# Patient Record
Sex: FEMALE | Race: WHITE | NOT HISPANIC OR LATINO
[De-identification: names, ages, dates, MRNs, and addresses within clinical notes are randomized per-mention and may not be internally consistent; named-entity substitution may affect disease eponyms.]

---

## 2019-10-11 ENCOUNTER — APPOINTMENT (OUTPATIENT)
Dept: OTOLARYNGOLOGY | Facility: CLINIC | Age: 64
End: 2019-10-11
Payer: COMMERCIAL

## 2019-10-11 VITALS
WEIGHT: 120 LBS | DIASTOLIC BLOOD PRESSURE: 90 MMHG | SYSTOLIC BLOOD PRESSURE: 120 MMHG | HEART RATE: 80 BPM | HEIGHT: 61 IN | BODY MASS INDEX: 22.66 KG/M2

## 2019-10-11 DIAGNOSIS — Z87.891 PERSONAL HISTORY OF NICOTINE DEPENDENCE: ICD-10-CM

## 2019-10-11 DIAGNOSIS — H60.02 ABSCESS OF LEFT EXTERNAL EAR: ICD-10-CM

## 2019-10-11 DIAGNOSIS — Z80.9 FAMILY HISTORY OF MALIGNANT NEOPLASM, UNSPECIFIED: ICD-10-CM

## 2019-10-11 DIAGNOSIS — H92.02 OTALGIA, LEFT EAR: ICD-10-CM

## 2019-10-11 PROBLEM — Z00.00 ENCOUNTER FOR PREVENTIVE HEALTH EXAMINATION: Status: ACTIVE | Noted: 2019-10-11

## 2019-10-11 PROCEDURE — 99213 OFFICE O/P EST LOW 20 MIN: CPT | Mod: 25

## 2019-10-11 PROCEDURE — 69000 DRG XTRNL EAR ABSC/HEM SMPL: CPT | Mod: LT

## 2019-10-11 NOTE — ASSESSMENT
[FreeTextEntry1] : Left external canal abscess, I indeed. She will apply bacitracin and avoid manipulation and water exposure. A followup if worsens. I did not recommend oral antibiotics at this time

## 2019-10-11 NOTE — HISTORY OF PRESENT ILLNESS
[de-identified] : Patient previously seen March 2018 for intermittent left otalgia after swimming. Also complained of itching, fullness. She had previously been treated for otitis externa with drops. Also complained of nasal congestion. Previously diagnosed with TMJ dysfunction. Audiogram showed sloping mild to moderate sensory neural loss.\par \par Today complains of several days of the left otalgia and itching. She feels as if she has a pimple in her ear.  It is not affecting her hearing. She denies manipulation but has been swimming recently

## 2019-10-28 ENCOUNTER — APPOINTMENT (OUTPATIENT)
Dept: OTOLARYNGOLOGY | Facility: CLINIC | Age: 64
End: 2019-10-28
Payer: COMMERCIAL

## 2019-10-28 DIAGNOSIS — H61.22 IMPACTED CERUMEN, LEFT EAR: ICD-10-CM

## 2019-10-28 DIAGNOSIS — B36.9 SUPERFICIAL MYCOSIS, UNSPECIFIED: ICD-10-CM

## 2019-10-28 DIAGNOSIS — H62.40 SUPERFICIAL MYCOSIS, UNSPECIFIED: ICD-10-CM

## 2019-10-28 PROCEDURE — 99213 OFFICE O/P EST LOW 20 MIN: CPT | Mod: 25

## 2019-10-28 PROCEDURE — 69210 REMOVE IMPACTED EAR WAX UNI: CPT

## 2019-10-29 PROBLEM — H61.22 IMPACTED CERUMEN OF LEFT EAR: Status: ACTIVE | Noted: 2019-10-29

## 2019-10-29 PROBLEM — B36.9 FUNGAL OTITIS EXTERNA: Status: ACTIVE | Noted: 2019-10-29

## 2019-10-29 NOTE — ASSESSMENT
[FreeTextEntry1] : Left external canal otitis externa, likely fungal.  Cleared and sprayed with CSF.  Recommended alcohol and voids and eardrops as VoSol has been on backorder recently.  Followup if worsens.

## 2019-10-29 NOTE — HISTORY OF PRESENT ILLNESS
[de-identified] : Patient previously seen March 2018 for intermittent left otalgia after swimming. Also complained of itching, fullness. She had previously been treated for otitis externa with drops. Also complained of nasal congestion. Previously diagnosed with TMJ dysfunction. Audiogram showed sloping mild to moderate sensory neural loss.\par \par Seen October 11 with several days of the left otalgia and itching. She feels as if she has a pimple in her ear, n. to be decompressed a fluctuant mass and applied bacitracin. Now she feels as if the left ear is itchy and has some drainage..  It is not affecting her hearing. She denies manipulation but has been swimming recently

## 2020-01-13 ENCOUNTER — APPOINTMENT (OUTPATIENT)
Dept: OTOLARYNGOLOGY | Facility: CLINIC | Age: 65
End: 2020-01-13

## 2020-03-02 ENCOUNTER — APPOINTMENT (OUTPATIENT)
Dept: OTOLARYNGOLOGY | Facility: CLINIC | Age: 65
End: 2020-03-02
Payer: COMMERCIAL

## 2020-03-02 VITALS
SYSTOLIC BLOOD PRESSURE: 110 MMHG | WEIGHT: 120 LBS | BODY MASS INDEX: 22.66 KG/M2 | HEART RATE: 65 BPM | DIASTOLIC BLOOD PRESSURE: 71 MMHG | HEIGHT: 61 IN

## 2020-03-02 DIAGNOSIS — H90.3 SENSORINEURAL HEARING LOSS, BILATERAL: ICD-10-CM

## 2020-03-02 DIAGNOSIS — H60.8X3 OTHER OTITIS EXTERNA, BILATERAL: ICD-10-CM

## 2020-03-02 DIAGNOSIS — K21.9 GASTRO-ESOPHAGEAL REFLUX DISEASE W/OUT ESOPHAGITIS: ICD-10-CM

## 2020-03-02 DIAGNOSIS — J31.0 CHRONIC RHINITIS: ICD-10-CM

## 2020-03-02 PROCEDURE — 92567 TYMPANOMETRY: CPT

## 2020-03-02 PROCEDURE — 92557 COMPREHENSIVE HEARING TEST: CPT

## 2020-03-02 PROCEDURE — 31231 NASAL ENDOSCOPY DX: CPT

## 2020-03-02 PROCEDURE — 99213 OFFICE O/P EST LOW 20 MIN: CPT | Mod: 25

## 2020-03-02 RX ORDER — FLUTICASONE PROPIONATE 50 UG/1
50 SPRAY, METERED NASAL DAILY
Qty: 1 | Refills: 3 | Status: ACTIVE | COMMUNITY
Start: 2020-03-02 | End: 1900-01-01

## 2020-03-02 RX ORDER — HYDROCORTISONE AND ACETIC ACID OTIC 20.75; 10.375 MG/ML; MG/ML
1-2 SOLUTION AURICULAR (OTIC) TWICE DAILY
Qty: 1 | Refills: 3 | Status: ACTIVE | COMMUNITY
Start: 2020-03-02 | End: 1900-01-01

## 2020-03-02 NOTE — CONSULT LETTER
[Dear  ___] : Dear  [unfilled], [Courtesy Letter:] : I had the pleasure of seeing your patient, [unfilled], in my office today. [Please see my note below.] : Please see my note below. [Consult Closing:] : Thank you very much for allowing me to participate in the care of this patient.  If you have any questions, please do not hesitate to contact me. [Sincerely,] : Sincerely, [FreeTextEntry3] : Elise Barcenas MD\par

## 2020-03-02 NOTE — ASSESSMENT
[FreeTextEntry1] : She has history of chronic eczematous otitis externa. There was no acute infection. Audiogram shows a stable bilateral sensorineural hearing loss.\par \par She has chronic rhinitis. Nasal endoscopy shows mild nasal mucosal edema and a deviated septum. 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 \par -good aural hygiene\par -avoid using cotton swabs in the ears\par -wax removal drops as needed. \par -noise precautions\par -annual audiogram\par -consider HAE\par -Nasal saline irrigations, nasal steroid spray, and antihistamine decongestant as needed\par -Consider allergy evaluation\par -Reflux precautions. I asked her to consider a trial of  medication for reflux or undergoing pH probe testing\par -follow up in 3 weeks. \par -call and return earlier if any concerns. \par

## 2023-10-20 ENCOUNTER — APPOINTMENT (OUTPATIENT)
Dept: OTOLARYNGOLOGY | Facility: CLINIC | Age: 68
End: 2023-10-20